# Patient Record
Sex: MALE | Race: WHITE | NOT HISPANIC OR LATINO | URBAN - METROPOLITAN AREA
[De-identification: names, ages, dates, MRNs, and addresses within clinical notes are randomized per-mention and may not be internally consistent; named-entity substitution may affect disease eponyms.]

---

## 2025-01-29 ENCOUNTER — OFFICE VISIT (OUTPATIENT)
Dept: URGENT CARE | Age: 65
End: 2025-01-29
Payer: COMMERCIAL

## 2025-01-29 VITALS
HEART RATE: 68 BPM | RESPIRATION RATE: 16 BRPM | SYSTOLIC BLOOD PRESSURE: 132 MMHG | TEMPERATURE: 97.7 F | OXYGEN SATURATION: 98 % | DIASTOLIC BLOOD PRESSURE: 81 MMHG

## 2025-01-29 DIAGNOSIS — S90.852A FOREIGN BODY OF SKIN OF PLANTAR ASPECT OF FOOT, LEFT, INITIAL ENCOUNTER: Primary | ICD-10-CM

## 2025-01-29 DIAGNOSIS — S90.851A FOREIGN BODY OF SKIN OF PLANTAR ASPECT OF FOOT, RIGHT, INITIAL ENCOUNTER: ICD-10-CM

## 2025-01-29 ASSESSMENT — ENCOUNTER SYMPTOMS
FEVER: 0
CHILLS: 0

## 2025-01-29 NOTE — PROGRESS NOTES
Subjective   Patient ID: Demarco Rollins is a 64 y.o. male. They present today with a chief complaint of FB in foot (Concern for FB in bottom of feet ( copper wire pieces) x 2 months).    History of Present Illness  Patient presents for possible foreign body in his foot. He states that he was cutting copper wire two months ago when small fragments fell on the floor and he thinks he got them in his feet as he was barefoot. 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 He admits that he has tried digging them out at home and to get a few small pieces out but feels he still has remaining pieces. He denies any fevers, chills, pus drainage, redness or warmth to the areas.          Past Medical History  Allergies as of 01/29/2025 - Reviewed 01/29/2025   Allergen Reaction Noted    Cefdinir Nausea And Vomiting 11/20/2023    Lorazepam Anxiety 04/25/2022    Sulfa (sulfonamide antibiotics) Rash 01/29/2025       (Not in a hospital admission)       Past Medical History:   Diagnosis Date    Endocrine disorder, unspecified     Testosterone deficiency    Personal history of other diseases of the circulatory system     History of hypertension       Past Surgical History:   Procedure Laterality Date    OTHER SURGICAL HISTORY  02/05/2020    Fusion    OTHER SURGICAL HISTORY  02/05/2020    Laminectomy    OTHER SURGICAL HISTORY  02/05/2020    Pelvis fracture repair    OTHER SURGICAL HISTORY  02/05/2020    Back surgery    OTHER SURGICAL HISTORY  02/05/2020    Spinal cord stimulation    OTHER SURGICAL HISTORY  02/05/2020    Cholecystectomy    OTHER SURGICAL HISTORY  10/04/2021    Wrist fracture repair            Review of Systems  Review of Systems   Constitutional:  Negative for chills and fever.   Skin:         Positive for foreign body sensation to bottom of both feet. Negative for redness, drainage, warmth                                  Objective    Vitals:    01/29/25 1402   BP: 132/81   Pulse: 68   Resp: 16   Temp:  36.5 °C (97.7 °F)   SpO2: 98%     No LMP for male patient.    Physical Exam  Vitals reviewed.   Constitutional:       General: He is not in acute distress.  Skin:     Comments: There are 3 areas of callus formation with black appearance to bottom of left foot and 1 similar area to bottom of right foot.         Embedded Foreign Body Removal    Date/Time: 1/29/2025 3:11 PM    Performed by: Tiffany Roe PA-C  Authorized by: Tiffany Roe PA-C    Consent:     Consent obtained:  Written    Consent given by:  Patient    Risks discussed:  Bleeding, infection, pain, worsening of condition, incomplete removal and nerve damage    Alternatives discussed:  Observation and referral  Location:     Location:  Foot    Foot location: bottom of left and right foot.    Depth:  Intradermal    Tendon involvement:  None  Anesthesia:     Anesthesia method:  None  Procedure details:     Bloodless field: yes      Removal mechanism:  Forceps    Foreign bodies recovered:  4    Description:  1cm pieces of wire    Intact foreign body removal: yes    Post-procedure details:     Confirmation:  No additional foreign bodies on visualization    Skin closure:  None    Dressing: there was one spot to bottom of left lateral foot that did have a small amount of pus discharge present. there was no warmth or erythema of the skin. this was covered with bacitracin and bandage.    Patient tolerance of procedure: tolerated well.      Point of Care Test & Imaging Results from this visit  No results found for this visit on 01/29/25.   No results found.    Diagnostic study results (if any) were reviewed by Tiffany Roe PA-C.    Assessment/Plan   Allergies, medications, history, and pertinent labs/EKGs/Imaging reviewed by Tiffany Roe PA-C.     Medical Decision Making  MDM- Simple foreign body removal with no signs or symptoms of sepsis or cellulitis present, though a small amount of pus present. We did discuss to monitor closely for signs of infection  including redness, warmth or further drainage. See procedure note. Treat supportively. Return to clinic or presents to ER if symptoms change or worsen. Otherwise follow-up with PCP. Patient verbalized understanding and agrees with plan.      Orders and Diagnoses  Diagnoses and all orders for this visit:  Foreign body of skin of plantar aspect of foot, left, initial encounter  Foreign body of skin of plantar aspect of foot, right, initial encounter  Other orders  -     Embedded Foreign Body Removal      Medical Admin Record      Patient disposition: Home    Electronically signed by Tiffany Roe PA-C  3:14 PM

## 2025-03-31 ENCOUNTER — OFFICE VISIT (OUTPATIENT)
Dept: URGENT CARE | Age: 65
End: 2025-03-31
Payer: MEDICARE

## 2025-03-31 VITALS
TEMPERATURE: 97.2 F | DIASTOLIC BLOOD PRESSURE: 71 MMHG | RESPIRATION RATE: 16 BRPM | OXYGEN SATURATION: 98 % | SYSTOLIC BLOOD PRESSURE: 117 MMHG | HEART RATE: 77 BPM

## 2025-03-31 DIAGNOSIS — S90.851A FOREIGN BODY OF SKIN OF PLANTAR ASPECT OF FOOT, RIGHT, INITIAL ENCOUNTER: ICD-10-CM

## 2025-03-31 DIAGNOSIS — S90.852A FOREIGN BODY OF SKIN OF PLANTAR ASPECT OF FOOT, LEFT, INITIAL ENCOUNTER: Primary | ICD-10-CM

## 2025-03-31 PROCEDURE — 10120 INC&RMVL FB SUBQ TISS SMPL: CPT | Performed by: PHYSICIAN ASSISTANT

## 2025-03-31 PROCEDURE — 99213 OFFICE O/P EST LOW 20 MIN: CPT | Performed by: PHYSICIAN ASSISTANT

## 2025-03-31 ASSESSMENT — ENCOUNTER SYMPTOMS
CHILLS: 0
FEVER: 0

## 2025-03-31 NOTE — PROGRESS NOTES
Subjective   Patient ID: Demarco Rollins is a 64 y.o. male. They present today with a chief complaint of foreign body in foot.    History of Present Illness  Patient presents for foreign body removal from both feet. He was seen by me on 1/29 for same. He state he feels like there are still some pieces of wire embedded in his feet. Notes he has callus formation. Denies fevers, chills, drainage, redness, or warmth.          Past Medical History  Allergies as of 03/31/2025 - Reviewed 03/31/2025   Allergen Reaction Noted    Cefdinir Nausea And Vomiting 11/20/2023    Lorazepam Anxiety 04/25/2022    Sulfa (sulfonamide antibiotics) Rash 01/29/2025       (Not in a hospital admission)       Past Medical History:   Diagnosis Date    Endocrine disorder, unspecified     Testosterone deficiency    Personal history of other diseases of the circulatory system     History of hypertension       Past Surgical History:   Procedure Laterality Date    OTHER SURGICAL HISTORY  02/05/2020    Fusion    OTHER SURGICAL HISTORY  02/05/2020    Laminectomy    OTHER SURGICAL HISTORY  02/05/2020    Pelvis fracture repair    OTHER SURGICAL HISTORY  02/05/2020    Back surgery    OTHER SURGICAL HISTORY  02/05/2020    Spinal cord stimulation    OTHER SURGICAL HISTORY  02/05/2020    Cholecystectomy    OTHER SURGICAL HISTORY  10/04/2021    Wrist fracture repair            Review of Systems  Review of Systems   Constitutional:  Negative for chills and fever.   Skin:         Negative for redness, warmth or discharge. Positive for foreign body sensation to bottom of feet.                                  Objective    Vitals:    03/31/25 1436   BP: 117/71   Pulse: 77   Resp: 16   Temp: 36.2 °C (97.2 °F)   SpO2: 98%     No LMP for male patient.    Physical Exam  Vitals reviewed.   Constitutional:       General: He is not in acute distress.     Appearance: He is not ill-appearing.   Skin:     Comments: Bottom of right 1st metatarsal reveals callus formation  without erythema, warmth or discharge. Bottom of left foot reveals callus formation.         Embedded Foreign Body Removal    Date/Time: 3/31/2025 3:20 PM    Performed by: Tiffany Roe PA-C  Authorized by: Tiffany Roe PA-C    Consent:     Consent obtained:  Verbal    Consent given by:  Patient    Risks discussed:  Bleeding, infection, pain, incomplete removal, worsening of condition and poor cosmetic result    Alternatives discussed:  Referral  Location:     Location:  Foot    Foot location: left and right sole.    Depth:  Intradermal  Anesthesia:     Anesthesia method:  None  Procedure type:     Procedure complexity:  Simple  Procedure details:     Localization method:  Visualized (callus removed with 21G needle first, then visualized)    Bloodless field: yes      Removal mechanism:  Forceps    Foreign bodies recovered:  2    Description:  Approx 2mm piece of metal    Intact foreign body removal: yes    Post-procedure details:     Procedure completion:  Tolerated well, no immediate complications  Comments:      there was one spot to bottom of left lateral foot that did have a small amount of pus discharge present. there was no warmth or erythema of the skin. this was covered with bacitracin and bandage.      Point of Care Test & Imaging Results from this visit  No results found for this visit on 03/31/25.   Imaging  No results found.    Cardiology, Vascular, and Other Imaging  No other imaging results found for the past 2 days      Diagnostic study results (if any) were reviewed by Tiffany Roe PA-C.    Assessment/Plan   Allergies, medications, history, and pertinent labs/EKGs/Imaging reviewed by Tiffany Roe PA-C.     Medical Decision Making  MDM- Simple foreign body removal with no signs or symptoms of sepsis or cellulitis present, though a small amount of pus present. We did discuss to monitor closely for signs of infection including redness, warmth or further drainage. See procedure note. Treat  supportively. Return to clinic or presents to ER if symptoms change or worsen. Otherwise follow-up with PCP. Patient verbalized understanding and agrees with plan.      Orders and Diagnoses  Diagnoses and all orders for this visit:  Foreign body of skin of plantar aspect of foot, left, initial encounter  Foreign body of skin of plantar aspect of foot, right, initial encounter  Other orders  -     Embedded Foreign Body Removal      Medical Admin Record      Patient disposition: Home    Electronically signed by Tiffany Roe PA-C  3:23 PM